# Patient Record
Sex: FEMALE | Race: WHITE | NOT HISPANIC OR LATINO | URBAN - METROPOLITAN AREA
[De-identification: names, ages, dates, MRNs, and addresses within clinical notes are randomized per-mention and may not be internally consistent; named-entity substitution may affect disease eponyms.]

---
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
## 2024-04-01 ENCOUNTER — OFFICE VISIT (OUTPATIENT)
Dept: URGENT CARE | Facility: CLINIC | Age: 19
End: 2024-04-01
Payer: COMMERCIAL

## 2024-04-01 VITALS
HEART RATE: 79 BPM | BODY MASS INDEX: 31.09 KG/M2 | RESPIRATION RATE: 20 BRPM | WEIGHT: 182.1 LBS | TEMPERATURE: 97.1 F | SYSTOLIC BLOOD PRESSURE: 110 MMHG | OXYGEN SATURATION: 98 % | HEIGHT: 64 IN | DIASTOLIC BLOOD PRESSURE: 70 MMHG

## 2024-04-01 DIAGNOSIS — J02.9 PHARYNGITIS, UNSPECIFIED ETIOLOGY: ICD-10-CM

## 2024-04-01 LAB
HETEROPH AB SER QL LA: NEGATIVE
POCT INT CON NEG: NEGATIVE
POCT INT CON POS: POSITIVE

## 2024-04-01 PROCEDURE — 86308 HETEROPHILE ANTIBODY SCREEN: CPT | Performed by: FAMILY MEDICINE

## 2024-04-01 PROCEDURE — 3078F DIAST BP <80 MM HG: CPT | Performed by: FAMILY MEDICINE

## 2024-04-01 PROCEDURE — 99213 OFFICE O/P EST LOW 20 MIN: CPT | Performed by: FAMILY MEDICINE

## 2024-04-01 PROCEDURE — 3074F SYST BP LT 130 MM HG: CPT | Performed by: FAMILY MEDICINE

## 2024-04-01 RX ORDER — IBUPROFEN 800 MG/1
800 TABLET ORAL EVERY 8 HOURS PRN
Qty: 15 TABLET | Refills: 0 | Status: SHIPPED | OUTPATIENT
Start: 2024-04-01 | End: 2024-04-06

## 2024-04-01 RX ORDER — AMOXICILLIN 500 MG/1
500 CAPSULE ORAL 2 TIMES DAILY
Qty: 20 CAPSULE | Refills: 0 | Status: SHIPPED | OUTPATIENT
Start: 2024-04-01 | End: 2024-04-11

## 2024-04-01 ASSESSMENT — ENCOUNTER SYMPTOMS
VOMITING: 0
HEADACHES: 1
SORE THROAT: 1

## 2024-04-01 NOTE — PROGRESS NOTES
"Subjective:     Taylor Contreras is a 18 y.o. female who presents for Follow-Up (X4days patient not feeling better )    HPI  Pt presents for evaluation of an acute problem  Pt with sore throat for the past 5 days   Had strep testing and throat culture which were both negative   Having sore throat constantly   Feeling fatigued   Having nausea   No vomiting or diarrhea   Has a mild cough and headaches   Having pain in ears     Review of Systems   HENT:  Positive for sore throat. Negative for congestion.    Gastrointestinal:  Negative for vomiting.   Neurological:  Positive for headaches.       PMH:  has a past medical history of Bipolar affective disorder in remission ([facility]) (7/26/2021).    She has no past medical history of ASTHMA, CAD (coronary artery disease), Cancer ([facility]), Congestive heart failure ([facility]), COPD, Diabetes, Hypertension, Infectious disease, Liver disease, Renal disorder, Seizure disorder ([facility]), or Stroke ([facility]).  MEDS:   Current Outpatient Medications:     amoxicillin (AMOXIL) 500 MG Cap, Take 1 Capsule by mouth 2 times a day for 10 days., Disp: 20 Capsule, Rfl: 0    ibuprofen (MOTRIN) 800 MG Tab, Take 1 Tablet by mouth every 8 hours as needed for Moderate Pain for up to 5 days., Disp: 15 Tablet, Rfl: 0  ALLERGIES: No Known Allergies  SURGHX: No past surgical history on file.  SOCHX:  reports that she has never smoked. She has never used smokeless tobacco. She reports that she does not drink alcohol and does not use drugs.     Objective:   /70   Pulse 79   Temp 36.2 °C (97.1 °F) (Temporal)   Resp 20   Ht 1.626 m (5' 4\")   Wt 82.6 kg (182 lb 1.6 oz)   SpO2 98%   BMI 31.26 kg/m²     Physical Exam  Constitutional:       General: She is not in acute distress.     Appearance: She is well-developed. She is not diaphoretic.   HENT:      Head: Normocephalic and atraumatic.      Right Ear: Tympanic membrane, ear canal and external ear normal.      Left Ear: Tympanic membrane, ear canal and " external ear normal.      Nose: Nose normal.      Mouth/Throat:      Mouth: Mucous membranes are moist.      Comments: Tonsils 1+, erythematous, with moderate purulent exudate present, no unilateral swelling or uvular deviation  Neck:      Trachea: No tracheal deviation.   Cardiovascular:      Rate and Rhythm: Normal rate and regular rhythm.   Pulmonary:      Effort: Pulmonary effort is normal. No respiratory distress.      Breath sounds: Normal breath sounds. No wheezing or rales.   Musculoskeletal:      Cervical back: Normal range of motion and neck supple. Tenderness present. No rigidity.   Lymphadenopathy:      Cervical: Cervical adenopathy present.   Skin:     General: Skin is warm and dry.      Findings: No rash.   Neurological:      Mental Status: She is alert.       Assessment/Plan:   Assessment    1. Pharyngitis, unspecified etiology  - POCT Mononucleosis (mono)  - amoxicillin (AMOXIL) 500 MG Cap; Take 1 Capsule by mouth 2 times a day for 10 days.  Dispense: 20 Capsule; Refill: 0  - ibuprofen (MOTRIN) 800 MG Tab; Take 1 Tablet by mouth every 8 hours as needed for Moderate Pain for up to 5 days.  Dispense: 15 Tablet; Refill: 0    Patient here for evaluation of pharyngitis.  Previously had point-of-care strep and throat culture which were both negative.  Monotest in office today negative.  Reviewed risks and benefits of continued supportive care measures versus empiric antibiotics.  Because of severity of symptoms, patient would like to empirically treat even if the medication does not help.  Antibiotic sent to pharmacy and reviewed other follow-up precautions.  Follow-up as needed.